# Patient Record
Sex: MALE | Race: WHITE | NOT HISPANIC OR LATINO | URBAN - METROPOLITAN AREA
[De-identification: names, ages, dates, MRNs, and addresses within clinical notes are randomized per-mention and may not be internally consistent; named-entity substitution may affect disease eponyms.]

---

## 2024-08-01 DIAGNOSIS — R97.20 ELEVATED PSA: Primary | ICD-10-CM

## 2024-09-19 ENCOUNTER — OFFICE VISIT (OUTPATIENT)
Dept: UROLOGY | Facility: CLINIC | Age: 59
End: 2024-09-19
Payer: COMMERCIAL

## 2024-09-19 ENCOUNTER — LAB VISIT (OUTPATIENT)
Dept: LAB | Facility: HOSPITAL | Age: 59
End: 2024-09-19
Attending: UROLOGY
Payer: COMMERCIAL

## 2024-09-19 DIAGNOSIS — R97.20 ELEVATED PSA: ICD-10-CM

## 2024-09-19 LAB
BILIRUB SERPL-MCNC: NEGATIVE MG/DL
BLOOD URINE, POC: NEGATIVE
COLOR, POC UA: YELLOW
GLUCOSE UR QL STRIP: NEGATIVE
KETONES UR QL STRIP: NEGATIVE
LEUKOCYTE ESTERASE URINE, POC: NEGATIVE
NITRITE, POC UA: NEGATIVE
PH, POC UA: 7
PROTEIN, POC: NEGATIVE
PSA SERPL-MCNC: 3.54 NG/ML
SPECIFIC GRAVITY, POC UA: 1.02
UROBILINOGEN, POC UA: 0.2

## 2024-09-19 PROCEDURE — 84153 ASSAY OF PSA TOTAL: CPT

## 2024-09-19 PROCEDURE — 81001 URINALYSIS AUTO W/SCOPE: CPT | Mod: PBBFAC | Performed by: UROLOGY

## 2024-09-19 PROCEDURE — 99213 OFFICE O/P EST LOW 20 MIN: CPT | Mod: PBBFAC | Performed by: UROLOGY

## 2024-09-19 PROCEDURE — 36415 COLL VENOUS BLD VENIPUNCTURE: CPT

## 2024-09-19 RX ORDER — METOPROLOL TARTRATE 50 MG/1
TABLET ORAL
COMMUNITY

## 2024-09-19 RX ORDER — ALPRAZOLAM 0.5 MG/1
TABLET ORAL
COMMUNITY

## 2024-09-20 ENCOUNTER — TELEPHONE (OUTPATIENT)
Dept: UROLOGY | Facility: CLINIC | Age: 59
End: 2024-09-20
Payer: COMMERCIAL

## 2024-09-21 NOTE — TELEPHONE ENCOUNTER
PSA is down to 3.54 so he does not need a prostate biopsy.  He already has follow-up arranged for three months with PSA so will keep that appointment.  Please inform him of this.

## 2024-12-17 ENCOUNTER — LAB VISIT (OUTPATIENT)
Dept: LAB | Facility: HOSPITAL | Age: 59
End: 2024-12-17
Attending: UROLOGY
Payer: COMMERCIAL

## 2024-12-17 DIAGNOSIS — R97.20 ELEVATED PSA: ICD-10-CM

## 2024-12-17 DIAGNOSIS — R97.20 ELEVATED PSA: Primary | ICD-10-CM

## 2024-12-17 LAB — PSA SERPL-MCNC: 4.33 NG/ML

## 2024-12-17 PROCEDURE — 36415 COLL VENOUS BLD VENIPUNCTURE: CPT

## 2024-12-17 PROCEDURE — 84153 ASSAY OF PSA TOTAL: CPT

## 2024-12-19 ENCOUNTER — OFFICE VISIT (OUTPATIENT)
Dept: UROLOGY | Facility: CLINIC | Age: 59
End: 2024-12-19
Payer: COMMERCIAL

## 2024-12-19 VITALS
DIASTOLIC BLOOD PRESSURE: 98 MMHG | HEART RATE: 104 BPM | SYSTOLIC BLOOD PRESSURE: 133 MMHG | TEMPERATURE: 98 F | WEIGHT: 187.63 LBS | OXYGEN SATURATION: 98 % | HEIGHT: 65 IN | RESPIRATION RATE: 20 BRPM | BODY MASS INDEX: 31.26 KG/M2

## 2024-12-19 DIAGNOSIS — R97.20 ELEVATED PSA: Primary | ICD-10-CM

## 2024-12-19 PROCEDURE — 99213 OFFICE O/P EST LOW 20 MIN: CPT | Mod: S$PBB,,, | Performed by: UROLOGY

## 2024-12-19 PROCEDURE — 3075F SYST BP GE 130 - 139MM HG: CPT | Mod: CPTII,,, | Performed by: UROLOGY

## 2024-12-19 PROCEDURE — 1159F MED LIST DOCD IN RCRD: CPT | Mod: CPTII,,, | Performed by: UROLOGY

## 2024-12-19 PROCEDURE — 3008F BODY MASS INDEX DOCD: CPT | Mod: CPTII,,, | Performed by: UROLOGY

## 2024-12-19 PROCEDURE — 1160F RVW MEDS BY RX/DR IN RCRD: CPT | Mod: CPTII,,, | Performed by: UROLOGY

## 2024-12-19 PROCEDURE — 3080F DIAST BP >= 90 MM HG: CPT | Mod: CPTII,,, | Performed by: UROLOGY

## 2024-12-19 PROCEDURE — 99214 OFFICE O/P EST MOD 30 MIN: CPT | Mod: PBBFAC | Performed by: UROLOGY

## 2024-12-19 RX ORDER — BUSPIRONE HYDROCHLORIDE 10 MG/1
10 TABLET ORAL 3 TIMES DAILY
COMMUNITY

## 2024-12-19 NOTE — PROGRESS NOTES
CC:  Elevated PSA    HPI:  Curt Dowling is a 59 y.o. male seen for follow-up of elevated PSA.  He had a PSA in October 2023 which was 2.66 and a repeat in July 2024 was 8.67.  It then came down to 3.54 but now today is back up a little to 4.33.      Lab Results:  PSA History:      Latest Reference Range & Units 10/24/2023 7/26/2024   Prostate Specific Antigen 2.66 8.67     09/19/24 15:40 12/17/24 14:48   3.54 4.33 (H)     ROS:  All systems reviewed and are negative except as documented in HPI and/or Assessment and Plan.     Patient Active Problem List:     There is no problem list on file for this patient.       Past Medical History:  History reviewed. No pertinent past medical history.     Past Surgical History:  Past Surgical History:   Procedure Laterality Date    CATARACT EXTRACTION, BILATERAL Bilateral     TONSILLECTOMY AND ADENOIDECTOMY      VASECTOMY          Family History:  History reviewed. No pertinent family history.     Social History:  Social History     Socioeconomic History    Marital status:    Tobacco Use    Smoking status: Never     Passive exposure: Current    Smokeless tobacco: Never        Allergies:  Review of patient's allergies indicates:  No Known Allergies     Objective:  Vitals:    12/19/24 1438   BP: (!) 133/98   Pulse: 104   Resp: 20   Temp: 98.1 °F (36.7 °C)     General:  Well developed, well nourished adult male in no acute distress  Abdomen: Soft, nontender, no masses  Extremities:  No clubbing, cyanosis, or edema  Neurologic:  Grossly intact  Musculoskeletal:  Normal tone   symmetrical, no nodules    Last MARY:  September 2024    Assessment:  1. Elevated PSA  - POCT URINE DIPSTICK WITH MICROSCOPE, AUTOMATED  - MRI Prostate W W/O Contrast; Future    Plan:  MRI of the prostate to help with the decision on whether to proceed with a prostate biopsy since the PSA continues to fluctuate into the abnormal range.     Follow-up tests needed:   MRI.     Return appointment:  After

## 2025-01-07 ENCOUNTER — HOSPITAL ENCOUNTER (OUTPATIENT)
Dept: RADIOLOGY | Facility: HOSPITAL | Age: 60
Discharge: HOME | End: 2025-01-07
Attending: UROLOGY
Payer: COMMERCIAL

## 2025-01-07 DIAGNOSIS — R97.20 ELEVATED PSA: ICD-10-CM

## 2025-01-07 PROCEDURE — A9577 INJ MULTIHANCE: HCPCS

## 2025-01-07 PROCEDURE — 72197 MRI PELVIS W/O & W/DYE: CPT | Mod: TC

## 2025-01-07 PROCEDURE — 25500020 PHARM REV CODE 255

## 2025-01-07 RX ADMIN — GADOBENATE DIMEGLUMINE 18 ML: 529 INJECTION, SOLUTION INTRAVENOUS at 07:01

## 2025-01-29 ENCOUNTER — OFFICE VISIT (OUTPATIENT)
Dept: UROLOGY | Facility: CLINIC | Age: 60
End: 2025-01-29
Payer: COMMERCIAL

## 2025-01-29 VITALS
RESPIRATION RATE: 20 BRPM | DIASTOLIC BLOOD PRESSURE: 87 MMHG | HEIGHT: 65 IN | SYSTOLIC BLOOD PRESSURE: 127 MMHG | BODY MASS INDEX: 30.82 KG/M2 | HEART RATE: 84 BPM | WEIGHT: 185 LBS | TEMPERATURE: 98 F | OXYGEN SATURATION: 99 %

## 2025-01-29 DIAGNOSIS — R97.20 ELEVATED PSA: Primary | ICD-10-CM

## 2025-01-29 LAB
BILIRUB SERPL-MCNC: NEGATIVE MG/DL
BLOOD URINE, POC: NEGATIVE
COLOR, POC UA: YELLOW
GLUCOSE UR QL STRIP: NEGATIVE
KETONES UR QL STRIP: NEGATIVE
LEUKOCYTE ESTERASE URINE, POC: NEGATIVE
NITRITE, POC UA: NEGATIVE
PH, POC UA: 6
PROTEIN, POC: NEGATIVE
SPECIFIC GRAVITY, POC UA: 1.02
UROBILINOGEN, POC UA: 1

## 2025-01-29 PROCEDURE — 3074F SYST BP LT 130 MM HG: CPT | Mod: CPTII,,, | Performed by: UROLOGY

## 2025-01-29 PROCEDURE — 99213 OFFICE O/P EST LOW 20 MIN: CPT | Mod: PBBFAC | Performed by: UROLOGY

## 2025-01-29 PROCEDURE — 3079F DIAST BP 80-89 MM HG: CPT | Mod: CPTII,,, | Performed by: UROLOGY

## 2025-01-29 PROCEDURE — 1159F MED LIST DOCD IN RCRD: CPT | Mod: CPTII,,, | Performed by: UROLOGY

## 2025-01-29 PROCEDURE — 81001 URINALYSIS AUTO W/SCOPE: CPT | Mod: PBBFAC | Performed by: UROLOGY

## 2025-01-29 PROCEDURE — 99213 OFFICE O/P EST LOW 20 MIN: CPT | Mod: S$PBB,,, | Performed by: UROLOGY

## 2025-01-29 PROCEDURE — 3008F BODY MASS INDEX DOCD: CPT | Mod: CPTII,,, | Performed by: UROLOGY

## 2025-01-29 NOTE — PROGRESS NOTES
CC:  Elevated PSA    HPI:  Curt Dowling is a 59 y.o. male seen for follow-up of elevated PSA.  He had a PSA in October 2023 which was 2.66 and a repeat in July 2024 was 8.67.  It then came down to 3.54 but now today is back up a little to 4.33.    MRI prostate 01/07/2025 without suspicious abnormalities  Denies urinary symptoms at this time. No complaints today.    Lab Results:  PSA History:      Latest Reference Range & Units 10/24/23 7/26/24 09/19/24 15:40 12/17/24 14:48   Prostate Specific Antigen 2.66 8.67 3.54 4.33 (H)     Urinalysis - 01/29/2025  Component 11:35   Color, UA Yellow   Spec Grav UA 1.025   pH, UA 6.0   WBC, UA Negative   Nitrite, UA Negative   Protein, POC Negative   Glucose, UA Negative   Ketones, UA Negative   Urobilinogen, UA 1.0   Bilirubin, POC Negative   Blood, UA Negative        ROS:  All systems reviewed and are negative except as documented in HPI and/or Assessment and Plan.     Patient Active Problem List:     There is no problem list on file for this patient.       Past Medical History:  History reviewed. No pertinent past medical history.     Past Surgical History:   Procedure Laterality Date    CATARACT EXTRACTION, BILATERAL Bilateral     TONSILLECTOMY AND ADENOIDECTOMY      VASECTOMY       Family History:  History reviewed. No pertinent family history.     Social History     Socioeconomic History    Marital status:    Tobacco Use    Smoking status: Never     Passive exposure: Current    Smokeless tobacco: Never      Allergies:  Review of patient's allergies indicates:  No Known Allergies     Objective:  Vitals:    01/29/25 1121   BP: 127/87   Pulse: 84   Resp: 20   Temp: 98.1 °F (36.7 °C)     General:  Well developed, well nourished adult male in no acute distress  Abdomen: Soft, nontender, no masses  Extremities:  No clubbing, cyanosis, or edema  Neurologic:  Grossly intact  Musculoskeletal:  Normal tone   symmetrical, no nodules    Last MARY:  September  2024    Assessment:  1. Elevated PSA  - POCT URINE DIPSTICK WITH MICROSCOPE, AUTOMATED    Plan:  MRI prostate without abnormalities   Continue to monitor PSA level at this time   RTC in 6 months with PSA level.     Follow-up tests needed:   PSA prior to next apt    Return appointment:  6 months

## 2025-01-29 NOTE — PROGRESS NOTES
Patient seen by Dr. CAMILA Franco will return in 6 months with psa. Written and verbal discharge instructions given

## 2025-01-30 NOTE — PROGRESS NOTES
I saw and evaluated the patient with the resident.  I discussed with the resident and agree with the resident's history, physical, assessment, findings and care plan as documented in the resident's note.        Mela Addison DO  Urology  [facility]

## 2025-04-24 ENCOUNTER — TELEPHONE (OUTPATIENT)
Dept: PULMONOLOGY | Facility: CLINIC | Age: 60
End: 2025-04-24
Payer: COMMERCIAL

## 2025-04-24 NOTE — TELEPHONE ENCOUNTER
----- Message from Lillie sent at 4/24/2025 11:10 AM CDT -----  Contact: [phone] [email or web address] Patient is calling for Medical Advice regarding:referred to you all for a sleep study appt How long has patient had these symptoms:Pharmacy name and phone#:Patient wants a call back or thru [facility]:call back Comments:He states he has been diagnosed with sleep apnea and he has a surgery coming up and they want a base line sleep study done before the surgery. The surgery is scheduled for May 30 Dr Carrasco is who referred him to you all he will be a new pt please advise Please advise patient replies from provider may take up to 48 hours.

## 2025-04-24 NOTE — TELEPHONE ENCOUNTER
Spoke to pt got him scheduled for a sleep consult pt stated he has a surgery coming up on 5/30 spoke to Md for further instructions

## 2025-04-28 ENCOUNTER — TELEPHONE (OUTPATIENT)
Dept: PULMONOLOGY | Facility: CLINIC | Age: 60
End: 2025-04-28
Payer: COMMERCIAL

## 2025-04-28 NOTE — TELEPHONE ENCOUNTER
----- Message from Star sent at 4/28/2025  1:45 PM CDT -----  Type: Patient CallWho Called: Patient Does the patient know what this is regarding? Pt is requesting a call back to discuss he is having maxillofacial surgery on 5/30 and his provider, Dr. Gan is requesting a sleep study prior to that. Please advise Does the patient rather a call back or a response via [facility]? callBest Call Back Number: [phone] Additional Information:

## 2025-04-28 NOTE — TELEPHONE ENCOUNTER
Spoke to pt about appt scheduled for 5/5 informed pt that an appointment will have to be had before the sleep study can be ordered

## 2025-05-05 ENCOUNTER — OFFICE VISIT (OUTPATIENT)
Dept: PULMONOLOGY | Facility: CLINIC | Age: 60
End: 2025-05-05

## 2025-05-05 VITALS
HEART RATE: 74 BPM | HEIGHT: 65 IN | BODY MASS INDEX: 30.01 KG/M2 | RESPIRATION RATE: 16 BRPM | OXYGEN SATURATION: 99 % | WEIGHT: 180.13 LBS | SYSTOLIC BLOOD PRESSURE: 120 MMHG | DIASTOLIC BLOOD PRESSURE: 80 MMHG

## 2025-05-05 DIAGNOSIS — M26.19 RETROGNATHIA: ICD-10-CM

## 2025-05-05 DIAGNOSIS — M26.29 OVERJET: ICD-10-CM

## 2025-05-05 DIAGNOSIS — G47.33 SEVERE OBSTRUCTIVE SLEEP APNEA: ICD-10-CM

## 2025-05-05 DIAGNOSIS — G47.33 OSA (OBSTRUCTIVE SLEEP APNEA): Primary | ICD-10-CM

## 2025-05-05 PROCEDURE — 99999 PR PBB SHADOW E&M-EST. PATIENT-LVL IV: CPT | Mod: PBBFAC,,, | Performed by: INTERNAL MEDICINE

## 2025-05-05 NOTE — PROGRESS NOTES
Initial Outpatient Pulmonary Evaluation       SUBJECTIVE:     Chief Complaint   Patient presents with    Sleep Apnea   [place] Sleepiness scale score 13    History of Present Illness:    Patient is a 59 y.o. male     History of Present Illness    CHIEF COMPLAINT:  Patient presents today for pre-operative evaluation for facial surgery    JAW AND BITE HISTORY:  He has a history of jaw and bite problems evaluated by orthodontist in his 20s. Surgery was recommended at that time but declined due to life circumstances. He denies any history of facial trauma or prior facial surgeries.    SLEEP APNEA:  He has obstructive sleep apnea diagnosed by sleep study at [facility] on May 4th, 2022, showing an AHI of 38 events per hour with lowest SpO2 of 77%. He reports loud snoring, significant daytime sleepiness, and witnessed apneic episodes. He has been prescribed CPAP but is unable to tolerate the device.    HYPERTENSION:  His highest reported blood pressure reading was 135/85. He was prescribed antihypertensive medication but is not compliant with the regimen.         STOP - BANG Questionnaire:     1. Snoring : Do you snore loudly ?    Yes    2. Tired : Do you often feel tired, fatigued, or sleepy during daytime? Yes    3. Observed: Has anyone observed you stop breathing during your sleep?   Yes     4. Blood pressure : Do you have or are you being treated for high blood pressure?   Yes    5. BMI :BMI more than 35 kg/m2?   No    6. Age : Age over 50 yr old?   Yes    7. Neck circumference:   For male, is your shirt collar 17 inches / 43cm or larger?  For female, is your shirt collar 16 inches / 41cm or larger?    No    8. Gender: Gender male?   Yes    STOP BANG SCORE 6    High risk of NICOLE: Yes 5 - 8  Intermediate risk of NICOLE: Yes 3 - 4  Low risk of NICOLE: Yes 0 - 2      References:   STOP Questionnaire   A Tool to Screen Patients for Obstructive Sleep Apnea: Betina Bond  "F.R.C.P.C., LINDA De JesusB.B.S., John Wilson M.D.,Dulce Maria Bond, Ph.D., LINDA EnnisB.B.S.,_ Ezequiel So.,_ Danielito Tamez M.D., Gallo Cheney, MEGHAR.C.P.C.; Anesthesiology 2008; 108:812-21 Copyright © 2008, the American Society of Anesthesiologists, Inc. [employer]      Review of Systems   Respiratory:  Positive for apnea, snoring and somnolence.    Psychiatric/Behavioral:  Positive for sleep disturbance.        Review of patient's allergies indicates:  No Known Allergies    Current Medications[1]    History reviewed. No pertinent past medical history.  Past Surgical History:   Procedure Laterality Date    CATARACT EXTRACTION, BILATERAL Bilateral     TONSILLECTOMY AND ADENOIDECTOMY      VASECTOMY       No family history on file.  Social History[2]       OBJECTIVE:     Vital Signs (Most Recent)  Vital Signs  Pulse: 74  Resp: 16  SpO2: 99 %  BP: 120/80  Patient Position: Sitting  Pain Score: 0-No pain  Height and Weight  Height: 5' 5" (165.1 cm)  Weight: 81.7 kg (180 lb 1.9 oz)  BSA (Calculated - sq m): 1.94 sq meters  BMI (Calculated): 30  Weight in (lb) to have BMI = 25: 149.9]  Wt Readings from Last 2 Encounters:   05/05/25 81.7 kg (180 lb 1.9 oz)   01/29/25 83.9 kg (185 lb)         Physical Exam:  Physical Exam   Constitutional: He is oriented to person, place, and time. He appears well-developed. No distress.   HENT:   Head: Normocephalic.   Pulmonary/Chest: Normal expansion and effort normal. No stridor. No respiratory distress. He has no wheezes. He has no rhonchi.   Neurological: He is alert and oriented to person, place, and time.   Psychiatric: He has a normal mood and affect. His behavior is normal. Judgment and thought content normal.   Nursing note and vitals reviewed.      Laboratory  No results found for: "WBC", "RBC", "HGB", "HCT", "MCV", "MCH", "MCHC", "RDW", "PLT", "MPV", "GRAN", "LYMPH", "MONO", "EOS", "BASO", "EOSINOPHIL", " ""BASOPHIL"    BMP  No results found for: "NA", "K", "CL", "CO2", "BUN", "CREATININE", "CALCIUM", "ANIONGAP", "ESTGFRAFRICA", "EGFRNONAA", "AST", "ALT", "PROT"    No results found for: "BNP"    No results found for: "TSH"    No results found for: "SEDRATE"    No results found for: "CRP"    No results found for: "IGE"    No results found for: "ASPERGILLUS"  No results found for: "AFUMIGATUSCL"     No results found for: "ACE"    Diagnostic Results:  I have personally reviewed today the following studies :    AS ABOVE    ASSESSMENT/PLAN:   Assessment & Plan    G47.33 NICOLE (obstructive sleep apnea)    IMPRESSION:  - Severe sleep apnea with 38 breathing interruptions per hour and lowest oxygen at 77% based on previous sleep study from May 4th, 2022.  - Discussed treatment options for sleep apnea and their effectiveness for different severity levels:  - 1. Oral appliance therapy: device from specialized dentist to adjust jaw forward during sleep (more suitable for mild to moderate cases)  - 2. Inspire device: pacemaker-like implant with wires to chest and tongue base, controlled by remote  - 3. Maxillomandibular advancement (MMA) surgery: extensive procedure to adjust facial bones and bring jaw forward  - Patient chose to proceed with MMA surgery scheduled for June 24-27 with Dr. GARCIA    NICOLE (OBSTRUCTIVE SLEEP APNEA):  - Patient to receive instructions on how to use the sleep study device.  - Ordered home sleep study for baseline assessment before surgery, including belt for potential future Inspire device consideration.  - Sleep center will call patient to arrange device pickup from the next building, likely within a week.  - Follow up in 3 months.         NICOLE (obstructive sleep apnea)  -     Home Sleep Study; Future    Severe obstructive sleep apnea  -     Home Sleep Study; Future    Retrognathia  -     Home Sleep Study; Future    Overjet  -     Home Sleep Study; Future      Follow-up with maxillofacial surgeon for MMA.  " Obtain a baseline home sleep study with belt.    Follow up in about 3 months (around 8/5/2025).    This note was prepared using voice recognition system and is likely to have sound alike errors that may have been overlooked even after proof reading.  Please call me with any questions    Discussed diagnosis, its evaluation, treatment and usual course. All questions answered.    Thank you for the courtesy of participating in the care of this patient    Blanca Sherman MD             [1]   Current Outpatient Medications   Medication Sig Dispense Refill    ALPRAZolam (XANAX) 0.5 MG tablet TAKE 1 TABLET BY MOUTH TWICE DAILY AS NEEDED FOR PANIC      busPIRone (BUSPAR) 10 MG tablet Take 10 mg by mouth 3 (three) times daily.      metoprolol tartrate (LOPRESSOR) 50 MG tablet Take 1 tablet twice a day by oral route.       No current facility-administered medications for this visit.   [2]   Social History  Tobacco Use    Smoking status: Never     Passive exposure: Current    Smokeless tobacco: Never

## 2025-05-06 ENCOUNTER — TELEPHONE (OUTPATIENT)
Dept: SLEEP MEDICINE | Facility: CLINIC | Age: 60
End: 2025-05-06
Payer: COMMERCIAL

## 2025-05-12 DIAGNOSIS — M26.19 RETROGNATHIA: ICD-10-CM

## 2025-05-12 DIAGNOSIS — G47.33 SEVERE OBSTRUCTIVE SLEEP APNEA: Primary | ICD-10-CM

## 2025-05-12 DIAGNOSIS — M26.29 OVERJET: ICD-10-CM

## 2025-05-12 DIAGNOSIS — G47.33 OSA (OBSTRUCTIVE SLEEP APNEA): ICD-10-CM

## 2025-05-20 PROCEDURE — 95806 SLEEP STUDY UNATT&RESP EFFT: CPT | Performed by: INTERNAL MEDICINE

## 2025-05-28 ENCOUNTER — HOSPITAL ENCOUNTER (OUTPATIENT)
Dept: SLEEP MEDICINE | Facility: HOSPITAL | Age: 60
Discharge: HOME | End: 2025-05-28
Attending: INTERNAL MEDICINE
Payer: COMMERCIAL

## 2025-05-28 DIAGNOSIS — M26.19 RETROGNATHIA: ICD-10-CM

## 2025-05-28 DIAGNOSIS — M26.29 OVERJET: ICD-10-CM

## 2025-05-28 DIAGNOSIS — G47.33 SEVERE OBSTRUCTIVE SLEEP APNEA: ICD-10-CM

## 2025-05-28 DIAGNOSIS — G47.33 OSA (OBSTRUCTIVE SLEEP APNEA): ICD-10-CM

## 2025-05-28 PROCEDURE — 95800 SLP STDY UNATTENDED: CPT | Mod: 26,,, | Performed by: INTERNAL MEDICINE

## 2025-05-28 NOTE — PROCEDURES
PHYSICIAN INTERPRETATION AND COMMENTS: Findings are consistent with severe, positional obstructive sleep  apnea(NICOLE), with indications of respiratory control instability. Indications of cardiac dysrhythmia are recorded.  CLINICAL HISTORY: 59 year old male. BMI 29. Known with NICOLE. Intolerant to CPAP. In process of performing MMA for NICOLE  therapy.  SLEEP STUDY FINDINGS: Patient underwent a 2 night Home Sleep Test and by behavioral criteria, slept for approximately  11.92 hours, with a sleep latency of 6 minutes and a sleep efficiency of 92.6%. Moderate sleep disordered breathing  (AHI=26) is noted based on a 4% hypopnea desaturation criteria, predominantly in the supine position (38 events/hour)  and with indications of respiratory control instability. The patient slept supine 49.9% of the night based on valid recording  time of 11.57 hours and is 2.7 times as likely to have apneas/hypopneas when supine. When considering more subtle  measures of sleep disordered breathing, the overall respiratory disturbance index is severe(RDI=40) based on a 1%  hypopnea desaturation criteria with confirmation by surrogate arousal indicators. The apneas/hypopneas are  accompanied by minimal oxygen desaturation (percent time below 90% SpO2: 4.6%, Min SpO2: 76.7%). The average  desaturation across all sleep disordered breathing events is 4%. Snoring occurs for 12.1% (30 dB) of the study, 5.4% is very  loud. The mean pulse rate is 79.6 BPM, with very frequent pulse rate variability (74 events with >= 6 BPM increase/decrease  per hour).  TREATMENT CONSIDERATIONS: Severe positional obstructive sleep apnea. Follow-up with maxillofacial surgery for  maxillomandibular advancement.  DISEASE MANAGEMENT CONSIDERATIONS: Return to the sleep clinic for re-evaluation of sleep disordered breathing after  surgery. The patient will be seen for a post-evaluation Sleep Medicine follow up to discuss the above results and treatment  recommendations.   Show Right And Left Pupillary Line Units: No